# Patient Record
Sex: MALE | Race: WHITE | NOT HISPANIC OR LATINO | Employment: OTHER | URBAN - METROPOLITAN AREA
[De-identification: names, ages, dates, MRNs, and addresses within clinical notes are randomized per-mention and may not be internally consistent; named-entity substitution may affect disease eponyms.]

---

## 2017-11-28 PROBLEM — I20.9 ANGINA, CLASS III: Status: ACTIVE | Noted: 2017-11-28

## 2017-11-29 PROBLEM — E63.9 INADEQUATE DIETARY ENERGY INTAKE: Status: ACTIVE | Noted: 2017-11-29

## 2017-12-04 PROBLEM — E63.9 INADEQUATE DIETARY ENERGY INTAKE: Status: ACTIVE | Noted: 2017-12-04

## 2017-12-15 ENCOUNTER — TELEPHONE (OUTPATIENT)
Dept: GASTROENTEROLOGY | Facility: CLINIC | Age: 72
End: 2017-12-15

## 2017-12-15 NOTE — TELEPHONE ENCOUNTER
----- Message from Jono Jean sent at 12/15/2017 11:10 AM CST -----  Contact: [phone]/self  Pt would like to know if you received his medical records from the other dr.  Please call and advise

## 2017-12-28 ENCOUNTER — OFFICE VISIT (OUTPATIENT)
Dept: GASTROENTEROLOGY | Facility: CLINIC | Age: 72
End: 2017-12-28
Payer: COMMERCIAL

## 2017-12-28 ENCOUNTER — TELEPHONE (OUTPATIENT)
Dept: GASTROENTEROLOGY | Facility: CLINIC | Age: 72
End: 2017-12-28

## 2017-12-28 VITALS
WEIGHT: 118.63 LBS | BODY MASS INDEX: 19.07 KG/M2 | DIASTOLIC BLOOD PRESSURE: 71 MMHG | SYSTOLIC BLOOD PRESSURE: 127 MMHG | HEIGHT: 66 IN

## 2017-12-28 DIAGNOSIS — R12 HEARTBURN: ICD-10-CM

## 2017-12-28 DIAGNOSIS — K59.00 CONSTIPATION, UNSPECIFIED CONSTIPATION TYPE: ICD-10-CM

## 2017-12-28 DIAGNOSIS — R10.13 ABDOMINAL PAIN, EPIGASTRIC: Primary | ICD-10-CM

## 2017-12-28 PROCEDURE — 99999 PR PBB SHADOW E&M-EST. PATIENT-LVL III: CPT | Mod: PBBFAC,,, | Performed by: NURSE PRACTITIONER

## 2017-12-28 PROCEDURE — 99204 OFFICE O/P NEW MOD 45 MIN: CPT | Mod: S$GLB,,, | Performed by: NURSE PRACTITIONER

## 2017-12-28 RX ORDER — HYOSCYAMINE SULFATE 0.125 MG
125 TABLET ORAL EVERY 4 HOURS PRN
COMMUNITY

## 2017-12-28 RX ORDER — METOPROLOL SUCCINATE 25 MG/1
25 TABLET, EXTENDED RELEASE ORAL DAILY
COMMUNITY

## 2017-12-28 RX ORDER — ESOMEPRAZOLE MAGNESIUM 40 MG/1
40 CAPSULE, DELAYED RELEASE ORAL
COMMUNITY

## 2017-12-28 NOTE — TELEPHONE ENCOUNTER
Please get records from previous GI workup with Dr. Delacruz- EGD, colonoscopy and imaging ( US and CT)

## 2017-12-28 NOTE — PROGRESS NOTES
Subjective:       Patient ID: Porter Medina is a 72 y.o. male.    Chief Complaint: Gastroesophageal Reflux and Abdominal Pain    HPI  Reports heartburn and epigastric and mid abdominal pain after eating described as a severe stomach ache.  Reports that he is eating less due to pain.    He has nausea but no vomiting.  Has had 30 pound weight loss over the last year.  He has had GI evaluation with Dr. Serna and Dr. Delacruz this year and is coming here for a third opinion.  He has had EGD, colonoscopy, US, CT, CTA and MRI abdomen.  I have results of MRI, which is significant only for gallbladder sludge, but do not have any of the other workup to review.   He is using nexium daily and zantac prn.  Reports that over the last ten days he has had no abdominal complaints.  He reports that he has made recent diet changes with smaller meals.    He has constipation chronically which he reports is worsened with the use of a pain patch for neuropathy.  He has a bowel movements every three to four days.  He will use clearlax if he goes 3-4 days with no bowel movement.  Denies blood with bowel movements or black stools.  He does have history of colon resection due to stricture.    He uses levsin for anal cramping.  He does note that cramping is worse with constipation and hard stools.   Lesvin is not helpful for his abdominal complaints.    Review of Systems   Constitutional: Positive for appetite change and unexpected weight change. Negative for activity change, fatigue and fever.   HENT: Negative.  Negative for sore throat and trouble swallowing.    Respiratory: Negative.  Negative for cough, choking and shortness of breath.    Cardiovascular: Negative.  Negative for chest pain.   Gastrointestinal: Positive for abdominal pain, constipation and rectal pain. Negative for blood in stool and diarrhea.   Genitourinary: Negative.  Negative for difficulty urinating, dysuria and hematuria.   Musculoskeletal: Negative.  Negative for  neck pain and neck stiffness.   Skin: Negative.    Neurological: Negative.  Negative for dizziness, syncope, weakness and light-headedness.   Psychiatric/Behavioral: Negative.        Objective:      Physical Exam   Constitutional: He is oriented to person, place, and time. He appears well-developed and well-nourished. No distress.   HENT:   Head: Normocephalic.   Eyes: No scleral icterus.   Neck: Neck supple.   Cardiovascular: Normal rate.    Pulmonary/Chest: Effort normal. No respiratory distress.   Abdominal: Soft. Bowel sounds are normal. He exhibits no distension and no mass. There is no tenderness. There is no guarding.   Musculoskeletal: Normal range of motion.   Neurological: He is alert and oriented to person, place, and time.   Skin: Skin is warm and dry. He is not diaphoretic.   Psychiatric: He has a normal mood and affect. His behavior is normal. Judgment and thought content normal.   Vitals reviewed.      Assessment:       1. Abdominal pain, epigastric    2. Heartburn    3. Constipation, unspecified constipation type        Plan:     Ray was seen today for gastroesophageal reflux and abdominal pain.    Diagnoses and all orders for this visit:    Abdominal pain, epigastric  -     NM Gastric Emptying; Future    Heartburn  -     NM Gastric Emptying; Future    Constipation, unspecified constipation type    I have explained the planned procedures to the patient.The risks, benefits and alternatives of the procedure were also explained in detail. Patient verbalized understanding, all questions were answered. The patient agrees to proceed as planned.      Begin miralax daily.  Continue small meals, which has made a difference in complaints over the last ten days.  Continue current medications.      Could consider HIDA scan or surgical referral for gallbladder sludge, though unsure that this is a cause of his current complaints.    He has had EGD, colonoscopy, US, CT and MRI this year and will request copies.

## 2017-12-28 NOTE — LETTER
December 28, 2017      Nakul Delacruz MD  [address] [place] [address] [facility], [facility] [address]           [facility]  [address] [place] [address]  Abundio ZIMMERMAN [address]  Phone: [phone]          Patient: Porter Medina   MR Number: [id number]   YOB: 1945   Date of Visit: 12/28/2017       Dear Dr. Nakul Delacruz:    Thank you for referring Porter Medina to me for evaluation. Attached you will find relevant portions of my assessment and plan of care.    If you have questions, please do not hesitate to call me. I look forward to following Porter Medina along with you.    Sincerely,    Philomena Joseph, [facility]    Enclosure  CC:  No Recipients    If you would like to receive this communication electronically, please contact [email or web address] or [phone] to request more information on [employer] Link access.    For providers and/or their staff who would like to refer a patient to [facility], please contact us through our one-stop-shop provider referral line, [facility], at [phone].    If you feel you have received this communication in error or would no longer like to receive these types of communications, please e-mail [email or web address]

## 2017-12-29 NOTE — TELEPHONE ENCOUNTER
Spoke with pt and he would like for me to e-mail the medical release for to his email address [email or web address]. Pt will send this signed form back through the mail. Verbal Understanding.

## 2018-01-03 ENCOUNTER — TELEPHONE (OUTPATIENT)
Dept: GASTROENTEROLOGY | Facility: CLINIC | Age: 73
End: 2018-01-03

## 2018-01-03 ENCOUNTER — HOSPITAL ENCOUNTER (OUTPATIENT)
Dept: RADIOLOGY | Facility: HOSPITAL | Age: 73
Discharge: HOME | End: 2018-01-03
Attending: NURSE PRACTITIONER
Payer: COMMERCIAL

## 2018-01-03 DIAGNOSIS — R12 HEARTBURN: ICD-10-CM

## 2018-01-03 DIAGNOSIS — R10.13 ABDOMINAL PAIN, EPIGASTRIC: ICD-10-CM

## 2018-01-03 PROCEDURE — 78264 GASTRIC EMPTYING IMG STUDY: CPT | Mod: TC

## 2018-01-03 PROCEDURE — 78264 GASTRIC EMPTYING IMG STUDY: CPT | Mod: 26,,, | Performed by: RADIOLOGY

## 2018-01-03 PROCEDURE — A9541 TC99M SULFUR COLLOID: HCPCS

## 2018-01-03 NOTE — TELEPHONE ENCOUNTER
----- Message from PEYTON Mcgregor sent at 1/3/2018 12:59 PM CST -----  Let him know that GES is normal

## 2018-11-01 ENCOUNTER — HOSPITAL ENCOUNTER (EMERGENCY)
Facility: HOSPITAL | Age: 73
Discharge: LEFT BEFORE DISCHARGE | End: 2018-11-01
Attending: SURGERY
Payer: COMMERCIAL

## 2018-11-01 VITALS
OXYGEN SATURATION: 98 % | WEIGHT: 116.94 LBS | SYSTOLIC BLOOD PRESSURE: 132 MMHG | RESPIRATION RATE: 16 BRPM | TEMPERATURE: 98 F | DIASTOLIC BLOOD PRESSURE: 70 MMHG | HEART RATE: 78 BPM | BODY MASS INDEX: 18.88 KG/M2

## 2018-11-01 DIAGNOSIS — Z53.29 LEFT AGAINST MEDICAL ADVICE: Primary | ICD-10-CM

## 2018-11-01 DIAGNOSIS — R10.9 ABDOMINAL PAIN, UNSPECIFIED ABDOMINAL LOCATION: ICD-10-CM

## 2018-11-01 LAB
ALBUMIN SERPL BCP-MCNC: 4.4 G/DL
ALP SERPL-CCNC: 160 U/L
ALT SERPL W/O P-5'-P-CCNC: 25 U/L
ANION GAP SERPL CALC-SCNC: 13 MMOL/L
AST SERPL-CCNC: 31 U/L
BASOPHILS # BLD AUTO: 0.02 K/UL
BASOPHILS NFR BLD: 0.5 %
BILIRUB SERPL-MCNC: 0.8 MG/DL
BILIRUB UR QL STRIP: NEGATIVE
BUN SERPL-MCNC: 12 MG/DL
CALCIUM SERPL-MCNC: 9.2 MG/DL
CHLORIDE SERPL-SCNC: 102 MMOL/L
CLARITY UR: CLEAR
CO2 SERPL-SCNC: 25 MMOL/L
COLOR UR: YELLOW
CREAT SERPL-MCNC: 0.9 MG/DL
DIFFERENTIAL METHOD: ABNORMAL
EOSINOPHIL # BLD AUTO: 0 K/UL
EOSINOPHIL NFR BLD: 0 %
ERYTHROCYTE [DISTWIDTH] IN BLOOD BY AUTOMATED COUNT: 11.8 %
EST. GFR  (AFRICAN AMERICAN): >60 ML/MIN/1.73 M^2
EST. GFR  (NON AFRICAN AMERICAN): >60 ML/MIN/1.73 M^2
GLUCOSE SERPL-MCNC: 108 MG/DL
GLUCOSE UR QL STRIP: NEGATIVE
HCT VFR BLD AUTO: 39.2 %
HGB BLD-MCNC: 13.5 G/DL
HGB UR QL STRIP: NEGATIVE
KETONES UR QL STRIP: ABNORMAL
LEUKOCYTE ESTERASE UR QL STRIP: NEGATIVE
LYMPHOCYTES # BLD AUTO: 0.7 K/UL
LYMPHOCYTES NFR BLD: 17 %
MCH RBC QN AUTO: 31.9 PG
MCHC RBC AUTO-ENTMCNC: 34.4 G/DL
MCV RBC AUTO: 93 FL
MONOCYTES # BLD AUTO: 0.2 K/UL
MONOCYTES NFR BLD: 5.2 %
NEUTROPHILS # BLD AUTO: 3.2 K/UL
NEUTROPHILS NFR BLD: 77.3 %
NITRITE UR QL STRIP: NEGATIVE
PH UR STRIP: 7 [PH] (ref 5–8)
PLATELET # BLD AUTO: 124 K/UL
PMV BLD AUTO: 11.2 FL
POTASSIUM SERPL-SCNC: 4.2 MMOL/L
PROT SERPL-MCNC: 6.7 G/DL
PROT UR QL STRIP: NEGATIVE
RBC # BLD AUTO: 4.23 M/UL
SODIUM SERPL-SCNC: 140 MMOL/L
SP GR UR STRIP: 1.01 (ref 1–1.03)
URN SPEC COLLECT METH UR: ABNORMAL
UROBILINOGEN UR STRIP-ACNC: NEGATIVE EU/DL
WBC # BLD AUTO: 4.07 K/UL

## 2018-11-01 PROCEDURE — 96372 THER/PROPH/DIAG INJ SC/IM: CPT | Mod: 59

## 2018-11-01 PROCEDURE — 80053 COMPREHEN METABOLIC PANEL: CPT

## 2018-11-01 PROCEDURE — 99284 EMERGENCY DEPT VISIT MOD MDM: CPT | Mod: 25

## 2018-11-01 PROCEDURE — 63600175 PHARM REV CODE 636 W HCPCS: Performed by: SURGERY

## 2018-11-01 PROCEDURE — 36415 COLL VENOUS BLD VENIPUNCTURE: CPT

## 2018-11-01 PROCEDURE — 51702 INSERT TEMP BLADDER CATH: CPT

## 2018-11-01 PROCEDURE — 81003 URINALYSIS AUTO W/O SCOPE: CPT

## 2018-11-01 PROCEDURE — 85025 COMPLETE CBC W/AUTO DIFF WBC: CPT

## 2018-11-01 RX ORDER — ONDANSETRON 2 MG/ML
4 INJECTION INTRAMUSCULAR; INTRAVENOUS
Status: COMPLETED | OUTPATIENT
Start: 2018-11-01 | End: 2018-11-01

## 2018-11-01 RX ORDER — ONDANSETRON 4 MG/1
4 TABLET, ORALLY DISINTEGRATING ORAL EVERY 8 HOURS PRN
Qty: 20 TABLET | Refills: 0 | Status: SHIPPED | OUTPATIENT
Start: 2018-11-01

## 2018-11-01 RX ORDER — DICYCLOMINE HYDROCHLORIDE 20 MG/1
20 TABLET ORAL 4 TIMES DAILY PRN
Qty: 15 TABLET | Refills: 0 | Status: SHIPPED | OUTPATIENT
Start: 2018-11-01 | End: 2018-12-01

## 2018-11-01 RX ORDER — MORPHINE SULFATE 2 MG/ML
2 INJECTION, SOLUTION INTRAMUSCULAR; INTRAVENOUS
Status: COMPLETED | OUTPATIENT
Start: 2018-11-01 | End: 2018-11-01

## 2018-11-01 RX ADMIN — ONDANSETRON 4 MG: 2 INJECTION, SOLUTION INTRAMUSCULAR; INTRAVENOUS at 03:11

## 2018-11-01 RX ADMIN — MORPHINE SULFATE 2 MG: 2 INJECTION, SOLUTION INTRAMUSCULAR; INTRAVENOUS at 03:11

## 2018-11-01 NOTE — ED TRIAGE NOTES
73 y.o. male presents to ER   Chief Complaint   Patient presents with    Urinary Retention   pt s/p colonoscopy this AM, reports unable to void since before the procedure. No acute distress noted.

## 2018-11-01 NOTE — ED PROVIDER NOTES
[facility]                                                 Chief Complaint  73 y.o. male with Urinary Retention    History of Present Illness  Porter Medina presents to the emergency room with urinary retention today  Patient had a colonoscopy in [place] this morning with urinary retention  Patient states that he has abdominal cramping post colonoscopy as well  Patient on exam has a soft abdomen with normal bowel sounds noted today  Rodriguez catheter placed in the ER produced clear urine, modest amount initially  The patient states he just feels bloated and crampy post colonoscopy today    The history is provided by the patient   device was not used during this ER visit  Medical history:Coronary artery disease, GERD, hyperlipidemia, MI  Surgeries: Aortogram, left heart catheterization, spine surgery  Allergies: Niacin  History reviewed. No pertinent family history.    Review of Systems and Physical Exam      Review of Systems  -- Constitution - no fever, denies fatigue, no weakness, no chills  -- Eyes - no tearing or redness, no visual disturbance  -- Ear, Nose - no tinnitus or earache, no nasal congestion or discharge  -- Mouth,Throat - no sore throat, no toothache, normal voice, normal swallowing  -- Respiratory - denies cough and congestion, no shortness of breath, no ANGELES  -- Cardiovascular - denies chest pain, no palpitations, denies claudication  -- Gastrointestinal - abdominal bloating after colonoscopy today  -- Genitourinary - urinary retention, denies dysuria hematuria today  -- Musculoskeletal - denies back pain, negative for myalgias and arthralgias   -- Neurological - no headache, denies weakness or seizure; no LOC  -- Skin - denies pallor, rash, or changes in skin. no hives or welts noted    Vital Signs  His oral temperature is 98 °F (36.7 °C).   His blood pressure is 132/70 and his pulse is 78.   His respiration is 16 and oxygen saturation is 98%.     Physical  Exam  -- Nursing note and vitals reviewed  -- Constitutional: Appears well-developed and well-nourished  -- Head: Atraumatic. Normocephalic. No obvious abnormality  -- Eyes: Pupils are equal and reactive to light. Normal conjunctiva and lids  -- Cardiac: Normal rate, regular rhythm and normal heart sounds  -- Pulmonary: Normal respiratory effort, breath sounds clear to auscultation  -- Abdominal: Soft, no tenderness. Normal bowel sounds. Normal liver edge  -- Musculoskeletal: Normal range of motion, no effusions. Joints stable   -- Neurological: No focal deficits. Showed good interaction with staff  -- Skin: Warm and dry. No evidence of rash or cellulitis    Emergency Room Course      Lab Results     K 4.2      CO2 25   BUN 12   CREATININE 0.9      ALKPHOS 160 (H)   AST 31   ALT 25   BILITOT 0.8   ALBUMIN 4.4   PROT 6.7   WBC 4.07   HGB 13.5 (L)   HCT 39.2 (L)    (L)     Urinalysis  -- Urinalysis performed during this ER visit showed no signs of infection      Radiology  -- Flat and erect abdominal x-ray performed in the ER today was within normal limits    Medications Given  morphine injection 2 mg (2 mg Intramuscular Given 11/1/18 1550)   ondansetron injection 4 mg (4 mg Intramuscular Given 11/1/18 1550)     ED Management  -- the patient had abdominal cramping and urinary tension post colonoscopy  -- flat and erect suggested an ileus, patient's lab work well within normal limits  -- patient with continued pain in the ER, CT scan suggested for full evaluation  -- patient refused further workup and would like to go home, understands the risks  -- patient signed out AMA stating he will follow up with his PCP in the morning    Diagnosis  -- The primary encounter diagnosis was Left against medical advice.   -- A diagnosis of Abdominal pain, unspecified abdominal location was also pertinent to this visit.    Disposition and Plan  -- Disposition: home  -- Condition: stable  -- Patient is of  sound mind and capable of making rational decisions  -- Patient is fully aware of the diagnosis and the consequences of leaving AMA  -- Pt was told inpatient treatment needed but wants to leave against advice  -- Patient signed the AMA papers; all questions answered. Voiced understanding    This note is dictated on Dragon Natural Speaking word recognition program.  There are word recognition mistakes that are occasionally missed on review.          Bin Schneider MD  11/01/18 6931